# Patient Record
Sex: MALE | Race: WHITE | NOT HISPANIC OR LATINO | Employment: OTHER | URBAN - METROPOLITAN AREA
[De-identification: names, ages, dates, MRNs, and addresses within clinical notes are randomized per-mention and may not be internally consistent; named-entity substitution may affect disease eponyms.]

---

## 2019-11-11 ENCOUNTER — OFFICE VISIT (OUTPATIENT)
Dept: URGENT CARE | Age: 79
End: 2019-11-11

## 2019-11-11 VITALS
DIASTOLIC BLOOD PRESSURE: 52 MMHG | RESPIRATION RATE: 20 BRPM | OXYGEN SATURATION: 100 % | SYSTOLIC BLOOD PRESSURE: 108 MMHG | HEART RATE: 67 BPM | TEMPERATURE: 97.6 F
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
## 2019-11-11 NOTE — PROGRESS NOTES
[facility]        NAME: Sujata Lozada is a 70 y o  male  : 1940    MRN: [id number]  DATE: 2019  TIME: 6:13 PM    /52   Pulse 67   Temp 97 6 °F (36 4 °C)   Resp 20   SpO2 100%     Assessment and Plan   Fall, initial encounter [W19  XXXA]  1  Fall, initial encounter           Patient Instructions       Follow up with PCP in 3-5 days  Proceed to  ER if symptoms worsen  Chief Complaint     Chief Complaint   Patient presents with    Fall     Pt tripped and fell 2x at home around 3/4am today  Pt c/o right leg pain  Fall was unwittnessed  Pt is belligerant and yelling since he came into facility  Pt is A&O but is not responding to provider's questions appropriately  History of Present Illness       Pt with a friend that states that his wife said he fell  At around 3-4 am   Friend stating she does not know what actually happened  Pt  Swinging his arms and holding right knee    Pt screaming  Not able to get history from pt   Friend states he cant walk   She had to help him to car      Fall   The accident occurred 12 to 24 hours ago  The fall occurred in unknown circumstances  The point of impact was the right hip and right knee  The pain is at a severity of 10/10  The symptoms are aggravated by ambulation  Pertinent negatives include no abdominal pain, bowel incontinence, fever, headaches, hearing loss, hematuria, loss of consciousness, nausea, numbness, tingling, visual change or vomiting  He has tried nothing for the symptoms  The treatment provided no relief  Review of Systems   Review of Systems   Constitutional: Negative for fever  Gastrointestinal: Negative for abdominal pain, bowel incontinence, nausea and vomiting  Genitourinary: Negative for hematuria  Neurological: Negative for tingling, loss of consciousness, numbness and headaches  All other systems reviewed and are negative          Current Medications     No current outpatient medications on file     Current Allergies     Allergies as of 11/11/2019    (Not on File)            The following portions of the patient's history were reviewed and updated as appropriate: allergies, current medications, past family history, past medical history, past social history, past surgical history and problem list      Past Medical History:   Diagnosis Date    Bone cancer ([facility] [address] )     Hypertension     Hypothyroidism        History reviewed  No pertinent surgical history  History reviewed  No pertinent family history  Medications have been verified  Objective   /52   Pulse 67   Temp 97 6 °F (36 4 °C)   Resp 20   SpO2 100%        Physical Exam     Physical Exam   Constitutional: He appears well-developed  Pt screaming in exam room  Unable to communicate with pt  All the pt can do is scream   Unable to determine if head injury       Pt's friend also unable to communicate with pt   She is on the phone with pt's wife        Ambulance called     Musculoskeletal:   Right knee pain +abrasion     Nursing note and vitals reviewed

## 2019-11-11 NOTE — PATIENT INSTRUCTIONS
Fall Prevention   WHAT YOU NEED TO KNOW:   Fall prevention includes ways to make your home and other areas safer  It also includes ways you can move more carefully to prevent a fall  Health conditions that cause changes in your blood pressure, vision, or muscle strength and coordination may increase your risk for falls  Medicines may also increase your risk for falls if they make you dizzy, weak, or sleepy  DISCHARGE INSTRUCTIONS:   Call 911 or have someone else call if:   · You have fallen and are unconscious  · You have fallen and cannot move part of your body  Contact your healthcare provider if:   · You have fallen and have pain or a headache  · You have questions or concerns about your condition or care  Fall prevention tips:   · Stand or sit up slowly  This may help you keep your balance and prevent falls  · Use assistive devices as directed  Your healthcare provider may suggest that you use a cane or walker to help you keep your balance  You may need to have grab bars put in your bathroom near the toilet or in the shower  · Wear shoes that fit well and have soles that   Wear shoes both inside and outside  Use slippers with good   Do not wear shoes with high heels  · Wear a personal alarm  This is a device that allows you to call 911 if you fall and need help  Ask your healthcare provider for more information  · Stay active  Exercise can help strengthen your muscles and improve your balance  Your healthcare provider may recommend water aerobics or walking  He or she may also recommend physical therapy to improve your coordination  Never start an exercise program without talking to your healthcare provider first      · Manage your medical conditions  Keep all appointments with your healthcare providers  Visit your eye doctor as directed  Home safety tips:   · Add items to prevent falls in the bathroom    Put nonslip strips on your bath or shower floor to prevent you from slipping  Use a bath mat if you do not have carpet in the bathroom  This will prevent you from falling when you step out of the bath or shower  Use a shower seat so you do not need to stand while you shower  Sit on the toilet or a chair in your bathroom to dry yourself and put on clothing  This will prevent you from losing your balance from drying or dressing yourself while you are standing  · Keep paths clear  Remove books, shoes, and other objects from walkways and stairs  Place cords for telephones and lamps out of the way so that you do not need to walk over them  Tape them down if you cannot move them  Remove small rugs  If you cannot remove a rug, secure it with double-sided tape  This will prevent you from tripping  · Install bright lights in your home  Use night lights to help light paths to the bathroom or kitchen  Always turn on the light before you start walking  · Keep items you use often on shelves within reach  Do not use a step stool to help you reach an item  · Paint or place reflective tape on the edges of your stairs  This will help you see the stairs better  Follow up with your healthcare provider as directed:  Write down your questions so you remember to ask them during your visits  © 2017 [employer] Information is for End User's use only and may not be sold, redistributed or otherwise used for commercial purposes  All illustrations and images included in CareNotes® are the copyrighted property of [employer]  or [employer]  The above information is an  only  It is not intended as medical advice for individual conditions or treatments  Talk to your doctor, nurse or pharmacist before following any medical regimen to see if it is safe and effective for you